# Patient Record
(demographics unavailable — no encounter records)

---

## 2024-11-11 NOTE — REVIEW OF SYSTEMS
[Negative] : Cardiovascular [Hearing Loss] : hearing loss [Earache] : no earache [Nosebleed] : no nosebleeds [Hoarseness] : no hoarseness [Nasal Discharge] : no nasal discharge [Sore Throat] : no sore throat [Postnasal Drip] : no postnasal drip [FreeTextEntry4] : (-) otorrhea  [FreeTextEntry5] : MARKUS [FreeTextEntry1] : Low Vit D

## 2024-11-11 NOTE — HISTORY OF PRESENT ILLNESS
[FreeTextEntry8] : 78 yo female with PMHx of HLD, vitamin D deficiency, osteopenia presents with decreased hearing in BL ears, R>L, for the past 4 days. Patient was seen at a walk in clinic on Thursday 11/7 for decreased hearing in the right ear, they did BL cerumen removal. Denies improvement after the procedure, endorses that it worsened. Has been using Debrox drops with no relief. Patient denies any associated pain, otorrhea, erythema, dizziness. Denies history of this issue. Patient with no other complaints or concerns. Patient denies recent illness or hospitalizations. Denies fevers, chills, headache, sob, chest pain, and abdominal pain.  [FreeTextEntry1] : 78 yo female with PMHx of HLD, vitamin D deficiency, osteopenia presents for decreased hearing in BL ears.

## 2024-11-11 NOTE — PHYSICAL EXAM
[No Acute Distress] : no acute distress [Well Nourished] : well nourished [Well Developed] : well developed [Well-Appearing] : well-appearing [Normal Sclera/Conjunctiva] : normal sclera/conjunctiva [PERRL] : pupils equal round and reactive to light [EOMI] : extraocular movements intact [Normal Outer Ear/Nose] : the outer ears and nose were normal in appearance [Normal Oropharynx] : the oropharynx was normal [No JVD] : no jugular venous distention [No Lymphadenopathy] : no lymphadenopathy [Supple] : supple [Thyroid Normal, No Nodules] : the thyroid was normal and there were no nodules present [No Respiratory Distress] : no respiratory distress  [No Accessory Muscle Use] : no accessory muscle use [Clear to Auscultation] : lungs were clear to auscultation bilaterally [Normal Rate] : normal rate  [Regular Rhythm] : with a regular rhythm [Normal S1, S2] : normal S1 and S2 [No Murmur] : no murmur heard [No Carotid Bruits] : no carotid bruits [No Abdominal Bruit] : a ~M bruit was not heard ~T in the abdomen [No Varicosities] : no varicosities [Pedal Pulses Present] : the pedal pulses are present [No Edema] : there was no peripheral edema [No Palpable Aorta] : no palpable aorta [No Extremity Clubbing/Cyanosis] : no extremity clubbing/cyanosis [Soft] : abdomen soft [Non Tender] : non-tender [Non-distended] : non-distended [No Masses] : no abdominal mass palpated [No HSM] : no HSM [Normal Bowel Sounds] : normal bowel sounds [No CVA Tenderness] : no CVA  tenderness [No Spinal Tenderness] : no spinal tenderness [No Joint Swelling] : no joint swelling [Grossly Normal Strength/Tone] : grossly normal strength/tone [No Rash] : no rash [Coordination Grossly Intact] : coordination grossly intact [No Focal Deficits] : no focal deficits [Normal Gait] : normal gait [Deep Tendon Reflexes (DTR)] : deep tendon reflexes were 2+ and symmetric [Normal Affect] : the affect was normal [Normal Insight/Judgement] : insight and judgment were intact [Normal Posterior Cervical Nodes] : no posterior cervical lymphadenopathy [Normal Anterior Cervical Nodes] : no anterior cervical lymphadenopathy [de-identified] : (+) BL cerumen impaction, unable to visualize TMs, able to visualize TMs after cerumen removal, (-) erythematous TM

## 2024-11-11 NOTE — PLAN
[FreeTextEntry1] : 78 yo female with PMHx of HLD, vitamin D deficiency, osteopenia presents with decreased hearing in BL ears, R>L. Patient was seen at a walk in clinic on Thursday 11/7 for decreased hearing in the right ear, they did BL cerumen removal. Denies improvement after the procedure, endorses that it worsened. Has been using Debrox drops with no relief. Patient denies any associated pain, otorrhea, erythema, dizziness. Denies history of this issue. Patient with no other complaints or concerns. Patient denies recent illness or hospitalizations. Denies fevers, chills, headache, sob, chest pain, and abdominal pain.   care plan reviewed medications reviewed  RTC if symptoms return

## 2024-11-11 NOTE — HEALTH RISK ASSESSMENT
[No] : No [Never (0 pts)] : Never (0 points) [No falls in past year] : Patient reported no falls in the past year [0] : 2) Feeling down, depressed, or hopeless: Not at all (0) [PHQ-2 Negative - No further assessment needed] : PHQ-2 Negative - No further assessment needed [Never] : Never [Patient/Caregiver not ready to engage] : , patient/caregiver not ready to engage [Audit-CScore] : 0 [[id number]] : 0

## 2024-11-11 NOTE — COUNSELING
[Fall prevention counseling provided] : Fall prevention counseling provided [Adequate lighting] : Adequate lighting [No throw rugs] : No throw rugs [Use proper foot wear] : Use proper foot wear [Behavioral health counseling provided] : Behavioral health counseling provided [Sleep ___ hours/day] : Sleep [unfilled] hours/day [Potential consequences of obesity discussed] : Potential consequences of obesity discussed [Encouraged to maintain food diary] : Encouraged to maintain food diary [Encouraged to increase physical activity] : Encouraged to increase physical activity [None] : None [Good understanding] : Patient has a good understanding of lifestyle changes and steps needed to achieve self management goal

## 2025-03-13 NOTE — HEALTH RISK ASSESSMENT
[No] : No [Never (0 pts)] : Never (0 points) [No falls in past year] : Patient reported no falls in the past year [0] : 2) Feeling down, depressed, or hopeless: Not at all (0) [PHQ-2 Negative - No further assessment needed] : PHQ-2 Negative - No further assessment needed [Patient/Caregiver not ready to engage] : , patient/caregiver not ready to engage [Never] : Never [Audit-CScore] : 0 [[id number]] : 0

## 2025-03-13 NOTE — HEALTH RISK ASSESSMENT
[No] : No [Never (0 pts)] : Never (0 points) [No falls in past year] : Patient reported no falls in the past year [0] : 2) Feeling down, depressed, or hopeless: Not at all (0) [PHQ-2 Negative - No further assessment needed] : PHQ-2 Negative - No further assessment needed [Patient/Caregiver not ready to engage] : , patient/caregiver not ready to engage [Never] : Never [Audit-CScore] : 0 [[id number]] : 0

## 2025-03-13 NOTE — HISTORY OF PRESENT ILLNESS
[FreeTextEntry1] : 77 y/o female with PMHx of HLD, vitamin D deficiency, osteopenia presents for a follow up visit. [de-identified] : 77 y/o female with PMHx of HLD, vitamin D deficiency, osteopenia presents for a follow up visit. Patient denies any acute complaints or concerns. She was last seen in November for hearing loss due to b/l cerumen impaction, which she reports has improved. Pt reports walking 40 min/day for exercise. She denies recent illnesses or hospitalizations. Pt also denies fever, chills, headache, chest pain, SOB, abdominal pain, diarrhea, or constipation.

## 2025-03-13 NOTE — PLAN
[FreeTextEntry1] : 79 y/o female with PMHx of HLD, vitamin D deficiency, osteopenia presents for a follow up visit. Patient denies any acute complaints or concerns. She was last seen in November for hearing loss due to b/l cerumen impaction, which she reports has improved. Pt reports walking 40 min/day for exercise. She denies recent illnesses or hospitalizations. Pt also denies fever, chills, headache, chest pain, SOB, abdominal pain, diarrhea, or constipation.  Recommend debrox ear drops.  care plan reviewed labs drawn  medications reviewed RTC in 3 months

## 2025-03-13 NOTE — HISTORY OF PRESENT ILLNESS
[FreeTextEntry1] : 77 y/o female with PMHx of HLD, vitamin D deficiency, osteopenia presents for a follow up visit. [de-identified] : 79 y/o female with PMHx of HLD, vitamin D deficiency, osteopenia presents for a follow up visit. Patient denies any acute complaints or concerns. She was last seen in November for hearing loss due to b/l cerumen impaction, which she reports has improved. Pt reports walking 40 min/day for exercise. She denies recent illnesses or hospitalizations. Pt also denies fever, chills, headache, chest pain, SOB, abdominal pain, diarrhea, or constipation.

## 2025-03-13 NOTE — REVIEW OF SYSTEMS
[Fatigue] : fatigue [Negative] : Heme/Lymph [Fever] : no fever [Chills] : no chills [Night Sweats] : no night sweats [FreeTextEntry5] : MARKUS [FreeTextEntry1] : Low Vit D